# Patient Record
Sex: FEMALE | Race: BLACK OR AFRICAN AMERICAN
[De-identification: names, ages, dates, MRNs, and addresses within clinical notes are randomized per-mention and may not be internally consistent; named-entity substitution may affect disease eponyms.]

---

## 2021-06-18 ENCOUNTER — HOSPITAL ENCOUNTER (OUTPATIENT)
Dept: HOSPITAL 84 - D.LDO | Age: 21
Discharge: HOME | End: 2021-06-18
Attending: OBSTETRICS & GYNECOLOGY
Payer: COMMERCIAL

## 2021-06-18 VITALS — BODY MASS INDEX: 23.4 KG/M2

## 2021-06-18 DIAGNOSIS — O36.5990: Primary | ICD-10-CM

## 2021-06-21 ENCOUNTER — HOSPITAL ENCOUNTER (OUTPATIENT)
Dept: HOSPITAL 84 - D.LDO | Age: 21
Discharge: HOME | End: 2021-06-21
Attending: OBSTETRICS & GYNECOLOGY
Payer: COMMERCIAL

## 2021-06-21 VITALS — BODY MASS INDEX: 23.4 KG/M2

## 2021-06-21 DIAGNOSIS — O41.00X0: Primary | ICD-10-CM

## 2021-06-28 ENCOUNTER — HOSPITAL ENCOUNTER (INPATIENT)
Dept: HOSPITAL 84 - D.LD | Age: 21
LOS: 3 days | Discharge: HOME | End: 2021-07-01
Attending: OBSTETRICS & GYNECOLOGY | Admitting: OBSTETRICS & GYNECOLOGY
Payer: COMMERCIAL

## 2021-06-28 VITALS
WEIGHT: 189 LBS | HEIGHT: 65 IN | BODY MASS INDEX: 31.49 KG/M2 | BODY MASS INDEX: 31.49 KG/M2 | HEIGHT: 65 IN | WEIGHT: 189 LBS

## 2021-06-28 VITALS — SYSTOLIC BLOOD PRESSURE: 126 MMHG | DIASTOLIC BLOOD PRESSURE: 65 MMHG

## 2021-06-28 DIAGNOSIS — Z3A.38: ICD-10-CM

## 2021-06-28 DIAGNOSIS — O36.5930: Primary | ICD-10-CM

## 2021-06-28 LAB
AMPHETAMINES UR QL SCN: NEGATIVE QUAL
BARBITURATES UR QL SCN: NEGATIVE QUAL
BENZODIAZ UR QL SCN: NEGATIVE QUAL
BZE UR QL SCN: NEGATIVE QUAL
CANNABINOIDS UR QL SCN: POSITIVE QUAL
ERYTHROCYTE [DISTWIDTH] IN BLOOD BY AUTOMATED COUNT: 12.6 % (ref 11.5–14.5)
HCT VFR BLD CALC: 29.6 % (ref 36–48)
HGB BLD-MCNC: 10.2 G/DL (ref 12–16)
MCH RBC QN AUTO: 30.4 PG (ref 26–34)
MCHC RBC AUTO-ENTMCNC: 34.3 G/DL (ref 31–37)
MCV RBC: 88.6 FL (ref 80–100)
OPIATES UR QL SCN: NEGATIVE QUAL
PCP UR QL SCN: NEGATIVE QUAL
PLATELET # BLD: 141 10X3/UL (ref 130–400)
PMV BLD AUTO: 10.8 FL (ref 7.4–10.4)
RBC # BLD AUTO: 3.34 10X6/UL (ref 4–5.4)
WBC # BLD AUTO: 8.7 10X3/UL (ref 4.8–10.8)

## 2021-06-28 NOTE — OP
PATIENT NAME:  NIK ALDRICH                              MEDICAL RECORD: [id number]
:89                                             LOCATION:[facility]     D.1218
                                                         ADMISSION DATE:21
SURGEON:  KYLE GEORGE DO            
 
 
DATE OF OPERATION:  2021
 
PREOPERATIVE DIAGNOSES:  Nonreassuring fetal heart tracing, category II tracing,
38 weeks, IUGR, remote from delivery.
 
POSTOPERATIVE DIAGNOSES:  Nonreassuring fetal heart tracing, category II
tracing, 38 weeks, IUGR, remote from delivery.
 
PRIMARY SURGEON:  Kyle George DO.
 
ANESTHESIA:  Spinal.
 
PROCEDURE:  Low transverse  via Pfannenstiel incision.
 
FINDINGS:  Viable male infant born at 6:32 a.m., weight 6 pounds 11 ounces,
Apgars 8 and 9.  Clear amniotic fluid.  No nuchal cord.  Grossly normal uterus,
bilateral tubes and ovaries.
 
SPECIMEN:  Placenta, cord blood, and cord pH, but cord pH was not done
immediately and had to sit in the refrig per request of Respiratory.
 
ESTIMATED BLOOD LOSS:  800 mL.
 
IV FLUIDS:  Per anesthesia.
 
URINE OUTPUT:  300 mL clear yellow urine.
 
INFECTION PROPHYLAXIS:  2 grams Ancef.
 
COMPLICATIONS:  None.
 
INDICATION:  Due to category II tracing, nonreassuring fetal heart tracing,
IUGR, and the patient only being 1 cm, and the patient was in repetitive decels,
the patient was counseled on a , risks and benefits and recommended
 for delivery.  The patient agreed.  Risks including bleeding, pain,
infection, damage to surrounding structures, repeat  were reviewed.
 
DESCRIPTION OF PROCEDURE:  The patient was taken to the operating room.  Spinal
anesthesia was administered.  She was prepped and draped in normal sterile
fashion in dorsal supine position with leftward tilt.  Pfannenstiel skin
incision was made with a scalpel and carried down to the underlying layer of
fascia.  The fascia was incised in the midline and the incision extended
laterally with Kline scissors.  The fascia was grasped with Kocher clamps. 
Rectus muscle dissected off sharply in the inferior and superior aspects. 
Rectus muscle  in the midline.  Peritoneum identified and noted to be
free of adherent bowel or bladder and entered bluntly.  Peritoneum stretched
with gentle traction.  Bladder blade placed.  Transverse incision made in the
uterus with scalpel.  This incision extended with upward and downward traction. 
Infant's head brought to the incision.  Infant delivered without difficulty. 
Mouth and nose were suctioned.  Cord was clamped and cut and handed to awaiting
pediatric nurse with good cry.  Placenta was then delivered manually.  Uterus
exteriorized.  Dry laparotomy sponge used to assure complete removal of
 
 
 
OPERATIVE REPORT                               [id number]    VALDEMARNIK            
 
 
placental membranes.  Hysterotomy reapproximated in a running locked fashion
with good hemostasis with 0 Vicryl.  Posterior cul-de-sac was irrigated and
suctioned to remove blood clots and fluid.  Uterus was noted to be firm.  Uterus
replaced back into the abdominal cavity and hemostasis adequate at the
hysterotomy site.  Gutters were cleaned with moist laparotomy sponges and
removed blood clots and fluid.  The above findings noted prior of the normal
placenta, normal uterus, tubes, and ovaries.  Muscles closed in a running
fashion with 2-0 Monocryl.  Fascia closed in a running fashion with 0 Vicryl. 
Muscle irrigated and no bleeding vessels noted prior to fascial closure. 
Subcutaneous layer irrigated.  Bleeding vessels cauterized with Bovie. 
Subcutaneous layer were reapproximated with plain gut and skin closed in a
subcuticular fashion with 3-0 Monocryl, Dermabond applied.  The patient
tolerated the procedure well.  All lap, needle, sponge counts correct times 2. 
The patient was taken to recovery room in stable condition.
 
TRANSINT:[id number] Voice Confirmation ID: [id number] DOCUMENT ID: [id number]
                                           
                                           KYLE GEORGE DO            
 
 
 
 
 
 
 
 
 
 
 
 
 
 
 
 
 
 
 
 
 
 
 
 
 
 
 
 
 
CC:                                                             [id number]
DICTATION DATE: 21     :     21 0647      DIS IN  
                                                                      21
[facility]                                          
[address] [place] [address]

## 2021-06-29 VITALS — SYSTOLIC BLOOD PRESSURE: 120 MMHG | DIASTOLIC BLOOD PRESSURE: 76 MMHG

## 2021-06-29 VITALS — SYSTOLIC BLOOD PRESSURE: 130 MMHG | DIASTOLIC BLOOD PRESSURE: 80 MMHG

## 2021-06-29 VITALS — SYSTOLIC BLOOD PRESSURE: 123 MMHG | DIASTOLIC BLOOD PRESSURE: 68 MMHG

## 2021-06-29 NOTE — NUR
ASSESSMENT COMPLETED.  PT IS IN BED AND HAS NO GOTTEN UP.  INCISION LOOKS GOOD
WITH NO REDNESS OR DRAINAGE. FUNDUS IS FIRM.  BLEEDING IS SMALL TO MOD.  PT
HAS SL IN HER RIGHT WRIST.  PT WAS INSTRUCTED ON USE OF THE INCENTIVE
SPIROMETER.  SHE PULLS 1500 CONSISTENLY.  SHE WAS INSTRUCTED TO USE THIS
DEVICE 10 TIMES EVERY HOUR SHE IS AWAKE.  IT WAS EXPLAINED THAT THIS WOULD
HELP PREVENT POST OP PNEUMONIA.  SHE VERBALIZED UNDERSTANDING.  PT HAS AN ICE
PACK TO HER INCISION.  MOVING ABOUT IN BED WELL. SIDE RAILS UP X 2 AND CALL
LIGHT BESIDE PT.

## 2021-06-29 NOTE — NUR
PAIN MED GIVEN FOR C/O PAIN THAT SHE RATES AT 7/10. FUNDUS FIRM AT U/U WITH
LIGHT BLEEDING. UNDERPADS CHANGED AND PT ABLE TO RAISE BOTTOM OFF OF BED AND
POSITION SELF FOR COMFORT. DENIES ANY OTHER NEEDS AT THIS TIME. CALL LIGHT IN
REACH.

## 2021-06-29 NOTE — NUR
RATES PAIN AT 2/10 AT THIS TIME.  ASSISTANCE IS GIVEN TO MOVE UP IN BED AND
TILT TO HER RIGHT SIDE, VERBAL INSTRUCTIONS ARE GIVEN ON WHEN AND HOW TO USE
INCENTIVE SPIROMETER. 350ML EMPTIED FROM UROMETER. LIGHTS OUT AS REQUESTED,
CALL LIGHT AND PHONE IN REACH WITH SIDE RAILS UP X 2. 3-point gait

## 2021-06-29 NOTE — NUR
PT AWAKE AND CALLS FOR PAIN MED, THIS RN TO BEDSIDE. IV PAIN MEDS GIVEN AS
SCANNED TO EMAR. SHE RATES PAIN/BURNING AT 6/10. FUNDUS FIRM AT U/2 WITH LIGHT
BLEEDING NOTED TO KARTIK PAD. SHE IS MOVING AROUND IN BED WITH NO NEED FOR
ASSISTANCE. CALL LIGHT AND PHONE IN REACH WITH SIDE RAILS UP X 2.

## 2021-06-29 NOTE — NUR
BEDSIDE REPORT RECEIVED FROM JEANNE ALCARAZ RN.  PT IS DROWSY BUT AWAKENS EASILY
WHEN SPOKEN TO. SHE RATES HER PAIN AT 0/10 AT THIS TIME.  FUNDUS FIRM WITH
MASSAGE AT U/1, LIGHT BLEEDING WITHOUT CLOTS NOTED.  BIKINI INCISION CLEAN AND
DRY. MUNROE CATH TO BEDSIDE GRAVITY DRAIN. SCD BILAT. CALL LIGHT IN REACH WITH
SIDE RAILS UP X 2.  LIGHTS DIMMED AS REQUESTED BY PATIENT.

## 2021-06-29 NOTE — MORECARE
CASE MANAGEMENT DISCHARGE SUMMARY
 
 
PATIENT: SHAZIA ALDRICH                        UNIT: [id number]
ACCOUNT#: [id number]                       ADM DATE: 21
AGE: 21     : [birth year]  SEX: F            ROOM/BED: D.1218    
AUTHOR: EDISON,DOC                             PHYSICIAN:                               
 
REFERRING PHYSICIAN: SILVIA GEORGE DO            
DATE OF SERVICE: 21
Case Management Discharge Planning Summary
 
 
COMMENTS 
ENTERED DATE:  21  15:57 CT
COMMENT TYPE:  Discharge Planning
REVIEWER: Michael Frank
Received consult for +UDS.  CM met with Shazia SPAULDING and Grand Mother 
of Baby, Romana Aldrich to discuss DC needs.  Mariam PIÑA was absent 
from room.  JASSON named her son Gil Gracia. MOB stated that she is unemployed 
but Mariam PIÑA works at [employer].  MOB stated that she, the FOB, and baby 
will live in their home at [address] [place] [address].  MOB 
stated that no other people will be living in the home.  MOB stated that the 
home environment is safe and stable.  MOB stated that she smokes tobacco 
outside the home and denied excessive alcohol use in the home, or indoor 
pets. JASSON plans to return home via car to her home with WANG at discharge.  
JASSON stated that this is her first child and as of yet has not attended any 
parenting classes.  CM gave JASSON a [employer] brochure for parenting 
classes.  JASSON stated that she will have plenty of help with caring for child 
through grandparents and family.  JASSON stated that she has reliable 
transportation.  JASSON stated that her home has city water, electric/gas, HVAC,
 and smoke detectors are installed at ceiling height. JASSON stated that she 
had prenatal care for the full pregnancy and that her Obstetrician was Dr. George.  MOB stated that she does not have a pediatrician for her son but 
it will most likely be Dr. Vazquez.  CM encouraged MOB to find a pediatrician 
before discharge so that nursing staff can help in making the appointment.  
MOB further stated that both she and the baby have Medicaid.  CM gave MOB a 
[facility] [employer] programs handout.  MOB stated that they do not use 
food stamps.  MOB stated that she has WIC but her son does not.  MOB stated 
that she has been calling for an appointment for her son.  CM reinforced the 
need to call for a WIC appointment and gave WIC resources handout to MOB.  
MOB stated that she plans to breastfeed and is coordinating for a breast 
pump through [facility].  MOB does not plan to use formula but knows not to use tap 
water but to use distilled water in formula preparation.  MOB stated that 
she has plenty of bottles, clothes, and diapers.  MOB further stated that 
she has a car seat.  MOB disclosed that she has smoked THC intermittently 
for 2 years and throughout the pregnancy due to anxiety and stress and for 
morning sickness.  MOB stated last use was 2 months prior to birth.  MOB 
denied having THC prescription.  MOB denies any concerns about taking the 
baby home. Denies any discharge planning needs at this time. CM gave 
 
handouts for [facility] [employer], [employer], 
[employer], and [employer].  CM will continue to follow and assist as 
needed with discharge planning / needs.
 
 
DCP REVIEW SUMMARY
ANTICIPATED D/C DATE: 
EXPECTED LOS : 
CASE STATUS:  DCP Initiated
INITIAL REVIEW:  2021
INITIAL REVIEWER:   Michael Frank
FINAL DISCHARGE DISPOSITION:  : 
FINAL REVIEWER:  
FINAL REVIEW DATE: 
 
  
 
DCP Focus Questions & Answers
 
 
QUESTION: ANSWER
 : 
 
 
 
 
 
PATIENT:  SHAZIA ALDRICH
ENCOUNTER:  [id number]
MEDICAL RECORD#:  [id number]
ADMISSION DATE:  2021
DISCHARGE DATE:  
ATTENDING MD:  
CALEB:   
AGE:  20
MARITAL STATUS:   S
DC PLAN ID:  [id number]
 
FACILITY:   [facility]
PRINTED ON: 21  16:07  CT
 
 
 
 
 
 
 
 
**All edits/amendments must be made on the electronic document**
 
DICTATION DATE: 21     : MARGARITA  21     
RPT#: [id number]                                DC DATE:        
                                               STATUS: ADM IN  
[facility]
 [place] [address]
***END OF REPORT***

## 2021-06-29 NOTE — NUR
LARGE ICE WATER AS REQUESTED,  ADDITIONAL CHICKEN BROTH ALSO PROVIDED.  PT
SITTING UP IN BED, RATES PAIN AT 2/10. NO OTHER NEEDS AT THIS TIME.

## 2021-06-29 NOTE — NUR
BONDING WITH INFANT, RATES PAIN AT 3/10 AND UNDERSTANDS TIME NEXT DOSE IA
AVAILABLE. 200ML CLEAR URINE NOTED TO UROMETER. SHE DOES ASK WHEN MUNROE CATH
WOULD BE REMOVED.  NEW ICE PACK TO INCISION AND LARGE ICE WATER AS REQUESTED.
NO OTHER NEEDS AT THIS TIME. CALL LIGHT IN REACH.

## 2021-06-29 NOTE — NUR
FUNDUS FIRM AT U/1, NO CLOTS NOTED WITH MASSAGE,  TOWELS CHANGED AND
POSITIONED TO RIGHT TILT.  BONDING WITH INFANT WITH FAMILYMEMBER/FRIEND AT
BEDSIDE.

## 2021-06-29 NOTE — MORECARE
CASE MANAGEMENT DISCHARGE SUMMARY
 
 
PATIENT: NIK ALDRICH                        UNIT: [id number]
ACCOUNT#: [id number]                       ADM DATE: 21
AGE: 21     : 89  SEX: F            ROOM/BED: D.1218    
AUTHOR: EDISON,DOC                             PHYSICIAN:                               
 
REFERRING PHYSICIAN: SILVIA DEVRIES DO            
DATE OF SERVICE: 21
Case Management Discharge Planning Summary
 
 
 
 
 
DCP REVIEW SUMMARY
ANTICIPATED D/C DATE: 
EXPECTED LOS : 
CASE STATUS:  DCP Initiated
INITIAL REVIEW:  2021
INITIAL REVIEWER:   Michael Frank
FINAL DISCHARGE DISPOSITION:  : 
FINAL REVIEWER:  
FINAL REVIEW DATE: 
 
  
 
DCP Focus Questions & Answers
 
 
QUESTION: ANSWER
 : 
 
 
 
 
 
PATIENT:  NIK ALDRICH
ENCOUNTER:  [id number]
MEDICAL RECORD#:  [id number]
ADMISSION DATE:  2021
DISCHARGE DATE:  
ATTENDING MD:  
CALEB:   
AGE:  20
MARITAL STATUS:   S
DC PLAN ID:  [id number]
 
FACILITY:   [facility]
PRINTED ON: 21  15:56  CT
 
 
 
 
 
 
 
 
**All edits/amendments must be made on the electronic document**
 
DICTATION DATE: 21 155     : MARGARITA  21 1556     
RPT#: [id number]                                DC DATE:        
                                               STATUS: ADM IN  
[facility]
 [place] [address]
***END OF REPORT***

## 2021-06-30 VITALS — DIASTOLIC BLOOD PRESSURE: 57 MMHG | SYSTOLIC BLOOD PRESSURE: 123 MMHG

## 2021-06-30 VITALS — DIASTOLIC BLOOD PRESSURE: 67 MMHG | SYSTOLIC BLOOD PRESSURE: 128 MMHG

## 2021-06-30 VITALS — DIASTOLIC BLOOD PRESSURE: 76 MMHG | SYSTOLIC BLOOD PRESSURE: 129 MMHG

## 2021-06-30 VITALS — SYSTOLIC BLOOD PRESSURE: 134 MMHG | DIASTOLIC BLOOD PRESSURE: 50 MMHG

## 2021-06-30 VITALS — DIASTOLIC BLOOD PRESSURE: 64 MMHG | SYSTOLIC BLOOD PRESSURE: 131 MMHG

## 2021-06-30 VITALS — SYSTOLIC BLOOD PRESSURE: 142 MMHG | DIASTOLIC BLOOD PRESSURE: 62 MMHG

## 2021-06-30 VITALS — SYSTOLIC BLOOD PRESSURE: 126 MMHG | DIASTOLIC BLOOD PRESSURE: 68 MMHG

## 2021-06-30 VITALS — SYSTOLIC BLOOD PRESSURE: 136 MMHG | DIASTOLIC BLOOD PRESSURE: 82 MMHG

## 2021-06-30 VITALS — DIASTOLIC BLOOD PRESSURE: 73 MMHG | SYSTOLIC BLOOD PRESSURE: 128 MMHG

## 2021-06-30 VITALS — DIASTOLIC BLOOD PRESSURE: 75 MMHG | SYSTOLIC BLOOD PRESSURE: 122 MMHG

## 2021-06-30 LAB
BASOPHILS NFR BLD AUTO: 0.2 % (ref 0–2)
EOSINOPHIL NFR BLD: 0.6 % (ref 0–7)
ERYTHROCYTE [DISTWIDTH] IN BLOOD BY AUTOMATED COUNT: 12.8 % (ref 11.5–14.5)
HCT VFR BLD CALC: 20.3 % (ref 36–48)
HCT VFR BLD CALC: 20.9 % (ref 36–48)
HGB BLD-MCNC: 6.9 G/DL (ref 12–16)
HGB BLD-MCNC: 7.1 G/DL (ref 12–16)
LYMPHOCYTES # BLD: 1 10X3/UL (ref 1.18–3.74)
LYMPHOCYTES NFR BLD AUTO: 11.2 % (ref 15–50)
MCH RBC QN AUTO: 30.5 PG (ref 26–34)
MCHC RBC AUTO-ENTMCNC: 34 G/DL (ref 31–37)
MCV RBC: 89.6 FL (ref 80–100)
MONOCYTES NFR BLD: 7.2 % (ref 2–11)
NEUTROPHILS # BLD: 7.4 10X3/UL (ref 1.56–6.13)
NEUTROPHILS NFR BLD AUTO: 80.8 % (ref 40–80)
PLATELET # BLD: 125 10X3/UL (ref 130–400)
PMV BLD AUTO: 10.4 FL (ref 7.4–10.4)
RBC # BLD AUTO: 2.33 10X6/UL (ref 4–5.4)
WBC # BLD AUTO: 9.2 10X3/UL (ref 4.8–10.8)

## 2021-06-30 NOTE — NUR
AM ASSESSMENT COMPLETED, SEE FLOWSHEET.  PT DENIES HEAVY BLEEDING OR PASSING
CLOTS.  PT DENIES SOB, DIFFICULTY BREATHING, CHEST PAIN, DENIES N/V, DENIES
DIZZINESS.  PT ALSO DENIES HEAVY BLEEDING OR PASSING CLOTS.  LARGE ICE WATER
SERVED TO PT.  SEE EMAR FOR ALL MEDS ADM BY THIS RN.  MED ADM RECORD REVIEWED
WITH PT.  PT IS USING I/S AND COUGHING AND DEEP BREATHING EXERCISES PER SELF.
SRUP X2, CALL LIGHT AND PHONE WITHIN REACH.

## 2021-06-30 NOTE — NUR
PT RESTING QUIETLY TRYING TO HAVE BABY EAT MORE.  THERE IS SOME FRUSTRATION IN
HER VOICE.  SHE HAS NO C/O OR NEEDS AT THIS TIME.

## 2021-06-30 NOTE — NUR
PT GOT UP AND WALKED TO THE BR WITH ASSIST.  SHE  VOIDED 200 ML.  AFTER THAT I
TOOK HER A MUG FULL OF ICE WATER FOR HER TO DRINK.  SHE DID VERY WELL
AMBULATING AND ONLY REQUIRED SBA.

## 2021-06-30 NOTE — NUR
SECOND UNIT OF BLOOD HUNG FOR PT.  SHE IS IN BED WITH NO C/O.  VS MACHINE
HOOKED UP READY TO TAKE VITALS THROUGHOUT THE PROCEDURE.  VOIDING WELL, FUNDUS
FIRM, LOCHIA SMALL.  PT IS VOIDING WELL.

## 2021-06-30 NOTE — NUR
DR. DEVRIES IN ROOM SPEAKING WITH PT REGARDING LAB WORK, AND PLAN OF CARE.  PT
AGREES TO INFUSION OF 2 UNITS PRBCS.  PT STATES SHE HAS NOT VOIDED SINCE THIS
AM.  PT AGAIN ENCOURAGED TO EMPTY BLADDER.  PT AGREES.

## 2021-06-30 NOTE — NUR
PT LYING IN BED, WITH HOB ELEVATE, ON CELL PHONE, LIGHTS ARE DIM IN ROOM,
INFANT IN CRIB, SLEEPING, NO DISTRESS NOTED IN MOM OR BABY.  LARGE FRESH ICE
WATER SERVED.  PT CONTINUES TO DENY DIZZINESS, CHEST PAIN, SOB, OR DIFFICULTY
BREATHING.  DENIES ALL OTHER NEEDS.  SRUP X2, CALL LIGHT AND PHONE WITHIN
REACH.

## 2021-06-30 NOTE — NUR
BLOOD COMPLETED.  VS TAKEN.  PT IS ANXIOUS TO GET UP TO GO TO THE BR.  SHE WAS
HELPED UP AND SHE STATES SHE VOIDED "ALOT".  SHE STATES SHE FELT SO MUCH
BETTER AFTER EMPTYING HER BLADDER. IV FLUIDS CONT.

## 2021-06-30 NOTE — NUR
ASSESSMENT COMPLETED.  BLOOD CONT TO HANG AT 200ML/HR.  VS HAVE BEEN WNL.  PT
IS FEEDING THE BABY.   IV IS IN THE RIGHT WRIST.

## 2021-06-30 NOTE — NUR
SECOND UNIT PRBCS STARTED  ML/HR ON PUMP, VERIFIED BY LYNDSAY MOREL RN.
PT DENIES SOB, CHILLS, FLANK PAIN, OR FEVER.  BEDSIDE SHIFT REPORT GIVEN.  PT
HAS VOIDED 900 MLS YELLOW URINE IN [facility], DENIES HEAVY BLEEDING.  PT IS
NOW ATTEMPTING TO BREASTFEED INFANT.  DENIES ALL OTHER NEEDS.  SR UP X2,
CL/PHONE WITHIN REACH.

## 2021-06-30 NOTE — NUR
TO PT'S ROOM, PT IS SITTING UP ON THE SIDE OF THE BED, CHANGING INFANT'S
DIAPER. REDRAW AND AM LAB H/H RESULTS EXPLAINED TO PT.  PT HAS VOIDED 300 MLS
IN [facility], STATES SHE DID THIS "RIGHT AT SHIFT CHANGE".  PT ENCOURAGED TO
GET UP TO THE BR, VOID IN [facility], AND CALL ME WHEN SHE DOES.  PT AGREES.
LYNDSAY CARRANZA LPN TO ROOM TO TAKE INFANT TO [facility].  PT DENEIS ALL OTHER NEEDS.
DIETARY HAS SERVED REGULAR LUNCH TRAY.

## 2021-06-30 NOTE — NUR
PHONE CALL RECEIVED FROM DR. KAYCE MD HAS REVIEWED LAB RESULTS FROM THIS
AM.  MD ORDERED REPEAT LAB DRAW FOR 10 AM.  WILL ADM COLACE AS WELL THIS AM.

## 2021-07-01 VITALS — DIASTOLIC BLOOD PRESSURE: 82 MMHG | SYSTOLIC BLOOD PRESSURE: 122 MMHG

## 2021-07-01 VITALS — DIASTOLIC BLOOD PRESSURE: 62 MMHG | SYSTOLIC BLOOD PRESSURE: 115 MMHG

## 2021-07-01 LAB
BASOPHILS NFR BLD AUTO: 0.3 % (ref 0–2)
EOSINOPHIL NFR BLD: 0.9 % (ref 0–7)
ERYTHROCYTE [DISTWIDTH] IN BLOOD BY AUTOMATED COUNT: 13.9 % (ref 11.5–14.5)
HCT VFR BLD CALC: 25.5 % (ref 36–48)
HGB BLD-MCNC: 8.6 G/DL (ref 12–16)
LYMPHOCYTES # BLD: 1.4 10X3/UL (ref 1.18–3.74)
LYMPHOCYTES NFR BLD AUTO: 17 % (ref 15–50)
MCH RBC QN AUTO: 29.6 PG (ref 26–34)
MCHC RBC AUTO-ENTMCNC: 33.6 G/DL (ref 31–37)
MCV RBC: 88.1 FL (ref 80–100)
MONOCYTES NFR BLD: 7.8 % (ref 2–11)
NEUTROPHILS # BLD: 5.9 10X3/UL (ref 1.56–6.13)
NEUTROPHILS NFR BLD AUTO: 74 % (ref 40–80)
PLATELET # BLD: 143 10X3/UL (ref 130–400)
PMV BLD AUTO: 10.2 FL (ref 7.4–10.4)
RBC # BLD AUTO: 2.9 10X6/UL (ref 4–5.4)
WBC # BLD AUTO: 8 10X3/UL (ref 4.8–10.8)

## 2021-07-01 NOTE — NUR
PT SITTING UP EATING BREAKFAST WITH INFANT IN CRIB AT BEDSIDE, RATES PAIN AT
3/10 AND DENIES NEED FOR MEDS AT THIS TIME. FUNDUS FIRM AT U/U WITH LIGHT
BLEEDING AND NO CLOTS WITH MASSAGE. IV INFUSING PER ORDERS. CALL LIGHT IN
REACH WITH SIDE RAILS UP X 2.

## 2021-07-01 NOTE — MORECARE
CASE MANAGEMENT DISCHARGE SUMMARY
 
 
PATIENT: SHAZIA ALDRICH                        UNIT: [id number]
ACCOUNT#: [id number]                       ADM DATE: 21
AGE: 21     : 89  SEX: F            ROOM/BED: D.1218    
AUTHOR: EDISON,DOC                             PHYSICIAN:                               
 
REFERRING PHYSICIAN: SILVIA GEORGE DO            
DATE OF SERVICE: 21
Case Management Discharge Planning Summary
 
 
COMMENTS 
ENTERED DATE:  21  15:57 CT
COMMENT TYPE:  Discharge Planning
REVIEWER: Michael Frank
Received consult for +UDS.  CM met with Shazia SPAULDING and Grand Mother 
of Baby, Romana Aldrich to discuss DC needs.  Mariam PIÑA was absent 
from room.  JASSON named her son Gil Gracia. MOB stated that she is unemployed 
but Mariam PIÑA works at [employer].  MOB stated that she, the FOB, and baby 
will live in their home at [address] [place] [address].  MOB 
stated that no other people will be living in the home.  MOB stated that the 
home environment is safe and stable.  MOB stated that she smokes tobacco 
outside the home and denied excessive alcohol use in the home, or indoor 
pets. JASSON plans to return home via car to her home with WANG at discharge.  
JASSON stated that this is her first child and as of yet has not attended any 
parenting classes.  CM gave JASSON a [employer] brochure for parenting 
classes.  JASSON stated that she will have plenty of help with caring for child 
through grandparents and family.  JASSON stated that she has reliable 
transportation.  JASSON stated that her home has city water, electric/gas, HVAC,
 and smoke detectors are installed at ceiling height. JASSON stated that she 
had prenatal care for the full pregnancy and that her Obstetrician was Dr. George.  MOB stated that she does not have a pediatrician for her son but 
it will most likely be Dr. Vazquez.  CM encouraged MOB to find a pediatrician 
before discharge so that nursing staff can help in making the appointment.  
MOB further stated that both she and the baby have Medicaid.  CM gave MOB a 
[facility] programs handout.  MOB stated that they do not use 
food stamps.  MOB stated that she has WIC but her son does not.  MOB stated 
that she has been calling for an appointment for her son.  CM reinforced the 
need to call for a WIC appointment and gave WIC resources handout to MOB.  
MOB stated that she plans to breastfeed and is coordinating for a breast 
pump through [facility].  MOB does not plan to use formula but knows not to use tap 
water but to use distilled water in formula preparation.  MOB stated that 
she has plenty of bottles, clothes, and diapers.  MOB further stated that 
she has a car seat.  MOB disclosed that she has smoked THC intermittently 
for 2 years and throughout the pregnancy due to anxiety and stress and for 
morning sickness.  MOB stated last use was 2 months prior to birth.  MOB 
denied having THC prescription.  MOB denies any concerns about taking the 
baby home. Denies any discharge planning needs at this time. CM gave 
 
handouts for [facility] [employer], [employer], 
[employer], and [employer].  CM will continue to follow and assist as 
needed with discharge planning / needs.
 
 
DCP REVIEW SUMMARY
ANTICIPATED D/C DATE: 
EXPECTED LOS : 
CASE STATUS:  DCP Initiated
INITIAL REVIEW:  2021
INITIAL REVIEWER:   Michael Frank
FINAL DISCHARGE DISPOSITION:  : 
FINAL REVIEWER:  
FINAL REVIEW DATE: 
 
  
 
DCP Focus Questions & Answers
 
 
QUESTION: ANSWER
 : 
 
 
 
 
 
PATIENT:  SHAZIA ALDRICH
ENCOUNTER:  [id number]
MEDICAL RECORD#:  [id number]
ADMISSION DATE:  2021
DISCHARGE DATE:  
ATTENDING MD:  
CALEB:   
AGE:  21
MARITAL STATUS:   S
DC PLAN ID:  [id number]
 
FACILITY:   [facility]
PRINTED ON: 21  13:17  CT
 
 
 
 
 
 
 
 
**All edits/amendments must be made on the electronic document**
 
DICTATION DATE: 21 1317     : MARGARITA  21 1317     
RPT#: [id number]                                DC DATE:        
                                               STATUS: ADM IN  
[facility]
 [place] [address]
***END OF REPORT***

## 2021-07-01 NOTE — NUR
VERBAL AND WRITTEN DISCHARGE INSTRUCTONS GONE OVER, PT IS GIVEN WRITTEN SCRIPT
FOR PERCOCET 5/325MG AND MOTRIN 600MG.  SHE STATES UNDERSTANDING TO CARE OF
INCISION AND S/S OF INFECTION.  DENIES QUESTIONS OR CONCERNS. NURSERY NOTIFIED
FOR INFANT DISCHARGE.

## 2021-07-01 NOTE — MORECARE
CASE MANAGEMENT DISCHARGE SUMMARY
 
 
PATIENT: SHAZIA ALDRICH                        UNIT: [id number]
ACCOUNT#: [id number]                       ADM DATE: 21
AGE: 21     : 99  SEX: F            ROOM/BED: D.1218    
AUTHOR: EDISON,DOC                             PHYSICIAN:                               
 
REFERRING PHYSICIAN: SILVIA GEORGE DO            
DATE OF SERVICE: 21
Case Management Discharge Planning Summary
 
 
COMMENTS 
ENTERED DATE:  21  15:57 CT
COMMENT TYPE:  Discharge Planning
REVIEWER: Michael Frank
Received consult for +UDS.  CM met with Shazia SPAULDING and Grand Mother 
of Baby, Romana Aldrich to discuss DC needs.  Mariam PIÑA was absent 
from room.  JASSON named her son Gil Gracia. MOB stated that she is unemployed 
but Mariam PIÑA works at [employer].  MOB stated that she, the FOB, and baby 
will live in their home at [address] [place] [address].  MOB 
stated that no other people will be living in the home.  MOB stated that the 
home environment is safe and stable.  MOB stated that she smokes tobacco 
outside the home and denied excessive alcohol use in the home, or indoor 
pets. JASSON plans to return home via car to her home with WANG at discharge.  
JASSON stated that this is her first child and as of yet has not attended any 
parenting classes.  CM gave JASSON a [employer] brochure for parenting 
classes.  JASSON stated that she will have plenty of help with caring for child 
through grandparents and family.  JASSON stated that she has reliable 
transportation.  JASSON stated that her home has city water, electric/gas, HVAC,
 and smoke detectors are installed at ceiling height. JASSON stated that she 
had prenatal care for the full pregnancy and that her Obstetrician was Dr. George.  MOB stated that she does not have a pediatrician for her son but 
it will most likely be Dr. Vazquez.  CM encouraged MOB to find a pediatrician 
before discharge so that nursing staff can help in making the appointment.  
MOB further stated that both she and the baby have Medicaid.  CM gave MOB a 
[facility] [place]note 2[place] Collarity programs handout.  MOB stated that they do not use 
food stamps.  MOB stated that she has WIC but her son does not.  MOB stated 
that she has been calling for an appointment for her son.  CM reinforced the 
need to call for a WIC appointment and gave WIC resources handout to MOB.  
MOB stated that she plans to breastfeed and is coordinating for a breast 
pump through [employer].  MOB does not plan to use formula but knows not to use tap 
water but to use distilled water in formula preparation.  MOB stated that 
she has plenty of bottles, clothes, and diapers.  MOB further stated that 
she has a car seat.  MOB disclosed that she has smoked THC intermittently 
for 2 years and throughout the pregnancy due to anxiety and stress and for 
morning sickness.  MOB stated last use was 2 months prior to birth.  MOB 
denied having THC prescription.  MOB denies any concerns about taking the 
baby home. Denies any discharge planning needs at this time. CM gave 
 
handouts for [facility] [place]note 2[place] [employer], [employer], 
[employer] and "[employer]  CM will continue to follow and assist as 
needed with discharge planning / needs.
 
 
DCP REVIEW SUMMARY
ANTICIPATED D/C DATE: 
EXPECTED LOS : 
CASE STATUS:  DCP Initiated
INITIAL REVIEW:  2021
INITIAL REVIEWER:   Michael Frank
FINAL DISCHARGE DISPOSITION:  : 
FINAL REVIEWER:  
FINAL REVIEW DATE: 
 
  
 
DCP Focus Questions & Answers
 
 
QUESTION: ANSWER
 : 
 
 
 
 
 
PATIENT:  SHAZIA ALDRICH
ENCOUNTER:  [id number]
MEDICAL RECORD#:  [id number]
ADMISSION DATE:  2021
DISCHARGE DATE:  2021
ATTENDING MD:  
CALEB:   
AGE:  21
MARITAL STATUS:   S
DC PLAN ID:  [id number]
 
FACILITY:   [facility]
PRINTED ON: 21  23:19  CT
 
 
 
 
 
 
 
 
**All edits/amendments must be made on the electronic document**
 
DICTATION DATE: 21     : MARGARITA  21     
RPT#: [id number]                                DC DATE:21
                                               STATUS: DIS IN  
[facility]
[address] [place] [address]
***END OF REPORT***

## 2021-07-01 NOTE — NUR
RATES PAIN AT 2/10.  LARGE CUP OF ICE WATER AS REQUESTED, NO OTHER NEEDS AT
THIS TIME. CALL [facility].

## 2024-09-04 NOTE — NUR
TOOK BABY TO THE NURSERY FOR NURSERY NURSE.  WHEN I WENT TO GET THE BABY I
PICKED HIM UP FROM HIS MOTHER'S SLEEPING ARMS.  MOM DID WAKE UP AND I TALKED
TO HER A SECOND TIME TONIGHT ABOUT NOT SLEEPING WITH HER BABY IN HER BED. Never smoker